# Patient Record
Sex: MALE | Race: AMERICAN INDIAN OR ALASKA NATIVE | NOT HISPANIC OR LATINO | URBAN - METROPOLITAN AREA
[De-identification: names, ages, dates, MRNs, and addresses within clinical notes are randomized per-mention and may not be internally consistent; named-entity substitution may affect disease eponyms.]

---

## 2018-05-16 ENCOUNTER — APPOINTMENT (OUTPATIENT)
Dept: GENERAL RADIOLOGY | Facility: CLINIC | Age: 63
End: 2018-05-16
Attending: EMERGENCY MEDICINE
Payer: COMMERCIAL

## 2018-05-16 ENCOUNTER — APPOINTMENT (OUTPATIENT)
Dept: CT IMAGING | Facility: CLINIC | Age: 63
End: 2018-05-16
Attending: EMERGENCY MEDICINE
Payer: COMMERCIAL

## 2018-05-16 ENCOUNTER — HOSPITAL ENCOUNTER (EMERGENCY)
Facility: CLINIC | Age: 63
Discharge: HOME | End: 2018-05-16
Attending: EMERGENCY MEDICINE | Admitting: EMERGENCY MEDICINE
Payer: COMMERCIAL

## 2018-05-16 VITALS
SYSTOLIC BLOOD PRESSURE: 133 MMHG | TEMPERATURE: 99.2 F | WEIGHT: 251 LBS | RESPIRATION RATE: 16 BRPM | OXYGEN SATURATION: 96 % | HEIGHT: 70 IN | DIASTOLIC BLOOD PRESSURE: 81 MMHG | BODY MASS INDEX: 35.93 KG/M2

## 2018-05-16 DIAGNOSIS — M62.81 GENERALIZED MUSCLE WEAKNESS: ICD-10-CM

## 2018-05-16 LAB
ALBUMIN UR-MCNC: NEGATIVE MG/DL
ANION GAP SERPL CALCULATED.3IONS-SCNC: 10 MMOL/L (ref 3–14)
APPEARANCE UR: CLEAR
BASOPHILS # BLD AUTO: 0 10E9/L (ref 0–0.2)
BASOPHILS NFR BLD AUTO: 0.2 %
BILIRUB UR QL STRIP: NEGATIVE
BUN SERPL-MCNC: 22 MG/DL (ref 7–30)
CALCIUM SERPL-MCNC: 8.8 MG/DL (ref 8.5–10.1)
CHLORIDE SERPL-SCNC: 100 MMOL/L (ref 94–109)
CO2 SERPL-SCNC: 26 MMOL/L (ref 20–32)
COLOR UR AUTO: YELLOW
CREAT SERPL-MCNC: 1.33 MG/DL (ref 0.66–1.25)
DIFFERENTIAL METHOD BLD: ABNORMAL
EOSINOPHIL # BLD AUTO: 0.1 10E9/L (ref 0–0.7)
EOSINOPHIL NFR BLD AUTO: 0.8 %
ERYTHROCYTE [DISTWIDTH] IN BLOOD BY AUTOMATED COUNT: 13.4 % (ref 10–15)
GFR SERPL CREATININE-BSD FRML MDRD: 54 ML/MIN/1.7M2
GLUCOSE SERPL-MCNC: 129 MG/DL (ref 70–99)
GLUCOSE UR STRIP-MCNC: NEGATIVE MG/DL
HCT VFR BLD AUTO: 38.8 % (ref 40–53)
HGB BLD-MCNC: 13.1 G/DL (ref 13.3–17.7)
HGB UR QL STRIP: NEGATIVE
IMM GRANULOCYTES # BLD: 0 10E9/L (ref 0–0.4)
IMM GRANULOCYTES NFR BLD: 0.2 %
INTERPRETATION ECG - MUSE: NORMAL
KETONES UR STRIP-MCNC: NEGATIVE MG/DL
LEUKOCYTE ESTERASE UR QL STRIP: NEGATIVE
LYMPHOCYTES # BLD AUTO: 1.4 10E9/L (ref 0.8–5.3)
LYMPHOCYTES NFR BLD AUTO: 11.4 %
MCH RBC QN AUTO: 28.9 PG (ref 26.5–33)
MCHC RBC AUTO-ENTMCNC: 33.8 G/DL (ref 31.5–36.5)
MCV RBC AUTO: 86 FL (ref 78–100)
MONOCYTES # BLD AUTO: 0.9 10E9/L (ref 0–1.3)
MONOCYTES NFR BLD AUTO: 7.3 %
NEUTROPHILS # BLD AUTO: 9.5 10E9/L (ref 1.6–8.3)
NEUTROPHILS NFR BLD AUTO: 80.1 %
NITRATE UR QL: NEGATIVE
NRBC # BLD AUTO: 0 10*3/UL
NRBC BLD AUTO-RTO: 0 /100
PH UR STRIP: 6 PH (ref 5–7)
PLATELET # BLD AUTO: 150 10E9/L (ref 150–450)
POTASSIUM SERPL-SCNC: 3.5 MMOL/L (ref 3.4–5.3)
RBC # BLD AUTO: 4.54 10E12/L (ref 4.4–5.9)
SODIUM SERPL-SCNC: 136 MMOL/L (ref 133–144)
SOURCE: NORMAL
SP GR UR STRIP: 1.01 (ref 1–1.03)
TROPONIN I SERPL-MCNC: <0.015 UG/L (ref 0–0.04)
TSH SERPL DL<=0.005 MIU/L-ACNC: 1.24 MU/L (ref 0.4–4)
UROBILINOGEN UR STRIP-MCNC: NORMAL MG/DL (ref 0–2)
WBC # BLD AUTO: 11.8 10E9/L (ref 4–11)

## 2018-05-16 PROCEDURE — 93005 ELECTROCARDIOGRAM TRACING: CPT

## 2018-05-16 PROCEDURE — 80048 BASIC METABOLIC PNL TOTAL CA: CPT | Performed by: EMERGENCY MEDICINE

## 2018-05-16 PROCEDURE — 85025 COMPLETE CBC W/AUTO DIFF WBC: CPT | Performed by: EMERGENCY MEDICINE

## 2018-05-16 PROCEDURE — 99285 EMERGENCY DEPT VISIT HI MDM: CPT | Mod: 25

## 2018-05-16 PROCEDURE — 70450 CT HEAD/BRAIN W/O DYE: CPT

## 2018-05-16 PROCEDURE — 84443 ASSAY THYROID STIM HORMONE: CPT | Performed by: EMERGENCY MEDICINE

## 2018-05-16 PROCEDURE — 71046 X-RAY EXAM CHEST 2 VIEWS: CPT

## 2018-05-16 PROCEDURE — 84484 ASSAY OF TROPONIN QUANT: CPT | Performed by: EMERGENCY MEDICINE

## 2018-05-16 PROCEDURE — 81003 URINALYSIS AUTO W/O SCOPE: CPT | Performed by: EMERGENCY MEDICINE

## 2018-05-16 NOTE — DISCHARGE INSTRUCTIONS
Please return to the ED if you have active chest pain, shortness of breath, nausea, sweatiness, or other acute changes.  Please follow up with your PCP in the next 2-3 days.          Weakness with Uncertain Cause  Based on your exam today, the exact cause of your weakness is not certain. But your weakness does not seem to be a sign of a serious illness at this time. Keep an eye on your symptoms and get medical advice as instructed below.  Home care    Rest at home today. Don't over-exert yourself.    Take any medicine as prescribed.    For the next few days, drink extra fluids (unless your healthcare provider wants you to restrict fluids for other reasons). Don't skip meals.    Unless otherwise directed, continue to take any prescription medicines.    Contact your healthcare provider if you have any questions or concerns.  Follow-up care  Follow up with your healthcare provider, or as advised.  When to seek medical advice  Call your healthcare provider right away for any of the following:    Symptoms get worse    Symptoms don't start getting better within 2 days    Fever of 100.4  F (38  C) or higher, or as directed by your healthcare provider  Call 911  Call 911 for any of these:    Chest, arm, neck, jaw, or upper back pain    Trouble breathing    Numbness or weakness of the face, one arm, or one leg    Slurred speech, confusion, or trouble speaking, walking, or seeing    Blood in vomit or stool (black or red color)    Loss of consciousness    Severe headache  Date Last Reviewed: 10/1/2017    6322-6809 [employer]. [address] [place] [address]. All rights reserved. This information is not intended as a substitute for professional medical care. Always follow your healthcare professional's instructions.

## 2018-05-16 NOTE — ED AVS SNAPSHOT
Emergency Department    [facility] [address]    Phone:  [phone]    Fax:  [phone]                                       Keny Bernard   MRN: [id number]    Department:   Emergency Department   Date of Visit:  5/16/2018           Patient Information     Date Of Birth          1955        Your diagnoses for this visit were:     Generalized muscle weakness        You were seen by Yuliana Nunez MD.      Follow-up Information     Follow up with [facility]. Schedule an appointment as soon as possible for a visit in 2 days.    Contact information:    [address] [place] [address]  [phone]          Discharge Instructions       Please return to the ED if you have active chest pain, shortness of breath, nausea, sweatiness, or other acute changes.  Please follow up with your PCP in the next 2-3 days.          Weakness with Uncertain Cause  Based on your exam today, the exact cause of your weakness is not certain. But your weakness does not seem to be a sign of a serious illness at this time. Keep an eye on your symptoms and get medical advice as instructed below.  Home care    Rest at home today. Don't over-exert yourself.    Take any medicine as prescribed.    For the next few days, drink extra fluids (unless your healthcare provider wants you to restrict fluids for other reasons). Don't skip meals.    Unless otherwise directed, continue to take any prescription medicines.    Contact your healthcare provider if you have any questions or concerns.  Follow-up care  Follow up with your healthcare provider, or as advised.  When to seek medical advice  Call your healthcare provider right away for any of the following:    Symptoms get worse    Symptoms don't start getting better within 2 days    Fever of 100.4  F (38  C) or higher, or as directed by your healthcare provider  Call 911  Call 911 for any of these:    Chest, arm, neck, jaw, or upper back  pain    Trouble breathing    Numbness or weakness of the face, one arm, or one leg    Slurred speech, confusion, or trouble speaking, walking, or seeing    Blood in vomit or stool (black or red color)    Loss of consciousness    Severe headache  Date Last Reviewed: 10/1/2017    9192-8240 [employer]. [address] [place] [address]. All rights reserved. This information is not intended as a substitute for professional medical care. Always follow your healthcare professional's instructions.          24 Hour Appointment Hotline       To make an appointment at any [facility], call [phone]). If you don't have a family doctor or clinic, we will help you find one. [facility] clinics are conveniently located to serve the needs of you and your family.             Review of your medicines      Our records show that you are taking the medicines listed below. If these are incorrect, please call your family doctor or clinic.        Dose / Directions Last dose taken    amLODIPine-atorvastatin 10-10 MG per tablet   Commonly known as:  CADUET   Dose:  1 tablet        Take 1 tablet by mouth daily   Refills:  0        ATENOLOL PO   Dose:  100 mg        Take 100 mg by mouth daily   Refills:  0        CITALOPRAM HYDROBROMIDE PO   Dose:  20 mg        Take 20 mg by mouth daily   Refills:  0        ENALAPRIL MALEATE PO   Dose:  20 mg        Take 20 mg by mouth   Refills:  0        HYDROCHLOROTHIAZIDE PO   Dose:  25 mg        Take 25 mg by mouth daily   Refills:  0        IMDUR PO   Dose:  60 mg        Take 60 mg by mouth daily   Refills:  0        LEVOTHYROXINE SODIUM PO   Dose:  25 mcg        Take 25 mcg by mouth daily   Refills:  0        METHYLPHENIDATE HCL PO   Dose:  20 mg        Take 20 mg by mouth daily ER(extended release)   Refills:  0        Multi-vitamin Tabs tablet   Dose:  1 tablet        Take 1 tablet by mouth daily   Refills:  0        OMEPRAZOLE PO   Dose:  20 mg         Take 20 mg by mouth 2 times daily (before meals)   Refills:  0        SIMVASTATIN PO   Dose:  20 mg        Take 20 mg by mouth At Bedtime   Refills:  0        TRAMADOL HCL PO   Dose:  50 mg        Take 50 mg by mouth every 6 hours as needed for moderate to severe pain   Refills:  0        VITAMIN D (CHOLECALCIFEROL) PO   Dose:  1000 Units        Take 1,000 Units by mouth daily   Refills:  0                Procedures and tests performed during your visit     Basic metabolic panel    CBC with platelets differential    CT Head w/o Contrast    EKG 12-lead, tracing only    TSH with free T4 reflex    Troponin I    UA reflex to Microscopic and Culture    XR Chest 2 Views      Orders Needing Specimen Collection     None      Pending Results     No orders found from 5/14/2018 to 5/17/2018.            Pending Culture Results     No orders found from 5/14/2018 to 5/17/2018.            Pending Results Instructions     If you had any lab results that were not finalized at the time of your Discharge, you can call the ED Lab Result RN at [phone]. You will be contacted by this team for any positive Lab results or changes in treatment. The nurses are available 7 days a week from 10A to 6:30P.  You can leave a message 24 hours per day and they will return your call.        Test Results From Your Hospital Stay        5/16/2018 12:07 PM      Component Results     Component Value Ref Range & Units Status    WBC 11.8 (H) 4.0 - 11.0 10e9/L Final    RBC Count 4.54 4.4 - 5.9 10e12/L Final    Hemoglobin 13.1 (L) 13.3 - 17.7 g/dL Final    Hematocrit 38.8 (L) 40.0 - 53.0 % Final    MCV 86 78 - 100 fl Final    MCH 28.9 26.5 - 33.0 pg Final    MCHC 33.8 31.5 - 36.5 g/dL Final    RDW 13.4 10.0 - 15.0 % Final    Platelet Count 150 150 - 450 10e9/L Final    Diff Method Automated Method  Final    % Neutrophils 80.1 % Final    % Lymphocytes 11.4 % Final    % Monocytes 7.3 % Final    % Eosinophils 0.8 % Final    % Basophils 0.2 % Final    %  Immature Granulocytes 0.2 % Final    Nucleated RBCs 0 0 /100 Final    Absolute Neutrophil 9.5 (H) 1.6 - 8.3 10e9/L Final    Absolute Lymphocytes 1.4 0.8 - 5.3 10e9/L Final    Absolute Monocytes 0.9 0.0 - 1.3 10e9/L Final    Absolute Eosinophils 0.1 0.0 - 0.7 10e9/L Final    Absolute Basophils 0.0 0.0 - 0.2 10e9/L Final    Abs Immature Granulocytes 0.0 0 - 0.4 10e9/L Final    Absolute Nucleated RBC 0.0  Final         5/16/2018 12:24 PM      Component Results     Component Value Ref Range & Units Status    Sodium 136 133 - 144 mmol/L Final    Potassium 3.5 3.4 - 5.3 mmol/L Final    Chloride 100 94 - 109 mmol/L Final    Carbon Dioxide 26 20 - 32 mmol/L Final    Anion Gap 10 3 - 14 mmol/L Final    Glucose 129 (H) 70 - 99 mg/dL Final    Urea Nitrogen 22 7 - 30 mg/dL Final    Creatinine 1.33 (H) 0.66 - 1.25 mg/dL Final    GFR Estimate 54 (L) >60 mL/min/1.7m2 Final    Non  GFR Calc    GFR Estimate If Black 66 >60 mL/min/1.7m2 Final    African American GFR Calc    Calcium 8.8 8.5 - 10.1 mg/dL Final         5/16/2018 12:29 PM      Component Results     Component Value Ref Range & Units Status    Troponin I ES <0.015 0.000 - 0.045 ug/L Final    The 99th percentile for upper reference range is 0.045 ug/L.  Troponin values   in the range of 0.045 - 0.120 ug/L may be associated with risks of adverse   clinical events.           5/16/2018 12:39 PM      Component Results     Component Value Ref Range & Units Status    Color Urine Yellow  Final    Appearance Urine Clear  Final    Glucose Urine Negative NEG^Negative mg/dL Final    Bilirubin Urine Negative NEG^Negative Final    Ketones Urine Negative NEG^Negative mg/dL Final    Specific Gravity Urine 1.013 1.003 - 1.035 Final    Blood Urine Negative NEG^Negative Final    pH Urine 6.0 5.0 - 7.0 pH Final    Protein Albumin Urine Negative NEG^Negative mg/dL Final    Urobilinogen mg/dL Normal 0.0 - 2.0 mg/dL Final    Nitrite Urine Negative NEG^Negative Final    Leukocyte  Esterase Urine Negative NEG^Negative Final    Source Midstream Urine  Final         5/16/2018 12:23 PM      Narrative     CHEST TWO VIEWS 5/16/2018 12:05 PM     HISTORY: Weakness.     COMPARISON: None.    FINDINGS: Heart size and pulmonary vascularity are within normal  limits. The lungs are clear. No pneumothorax or pleural effusion.         Impression     IMPRESSION: No radiographic evidence of acute chest abnormality.     SANDRA HECTOR MD         5/16/2018  1:07 PM      Narrative     CT HEAD WITHOUT CONTRAST  5/16/2018 12:08 PM    HISTORY: Headache and weakness.    TECHNIQUE: Scans were obtained through the head without IV contrast.  Radiation dose for this scan was reduced using automated exposure  control, adjustment of the mA and/or kV according to patient size, or  iterative reconstruction technique.    COMPARISON: None.    FINDINGS: Mild atrophy and chronic white matter disease.  No  hemorrhage, mass lesion, or focal area of acute infarction identified.  Paranasal sinuses are normal. No bony abnormality.        Impression     IMPRESSION:   1. Atrophy and chronic white matter disease.  2. Nothing acute.     YAYA GONGORA MD         5/16/2018 12:33 PM      Component Results     Component Value Ref Range & Units Status    TSH 1.24 0.40 - 4.00 mU/L Final                Clinical Quality Measure: Blood Pressure Screening     Your blood pressure was checked while you were in the emergency department today. The last reading we obtained was  BP: 133/81 . Please read the guidelines below about what these numbers mean and what you should do about them.  If your systolic blood pressure (the top number) is less than 120 and your diastolic blood pressure (the bottom number) is less than 80, then your blood pressure is normal. There is nothing more that you need to do about it.  If your systolic blood pressure (the top number) is 120-139 or your diastolic blood pressure (the bottom number) is 80-89, your blood  "pressure may be higher than it should be. You should have your blood pressure rechecked within a year by a primary care provider.  If your systolic blood pressure (the top number) is 140 or greater or your diastolic blood pressure (the bottom number) is 90 or greater, you may have high blood pressure. High blood pressure is treatable, but if left untreated over time it can put you at risk for heart attack, stroke, or kidney failure. You should have your blood pressure rechecked by a primary care provider within the next 4 weeks.  If your provider in the emergency department today gave you specific instructions to follow-up with your doctor or provider even sooner than that, you should follow that instruction and not wait for up to 4 weeks for your follow-up visit.        Thank you for choosing [facility]       Thank you for choosing [facility] for your care. Our goal is always to provide you with excellent care. Hearing back from our patients is one way we can continue to improve our services. Please take a few minutes to complete the written survey that you may receive in the mail after you visit with us. Thank you!        [employer] Information     [employer] lets you send messages to your doctor, view your test results, renew your prescriptions, schedule appointments and more. To sign up, go to [email or web address] . Click on \"Log in\" on the left side of the screen, which will take you to the Welcome page. Then click on \"Sign up Now\" on the right side of the page.     You will be asked to enter the access code listed below, as well as some personal information. Please follow the directions to create your username and password.     Your access code is: [email or web address]  Expires: 2018  2:57 PM     Your access code will  in 90 days. If you need help or a new code, please call your [facility] clinic or [phone].        Care EveryWhere ID     This is your Care EveryWhere ID. This could be used by other organizations " to access your [facility] medical records  [id number]        Equal Access to Services     JOAO ROQUE : Jaelyn Lilly, yeni smart, sebastian lu. So [facility] [phone].    ATENCIÓN: Si habla español, tiene a heck disposición servicios gratuitos de asistencia lingüística. Llame al [phone].    We comply with applicable federal civil rights laws and [place] laws. We do not discriminate on the basis of race, color, national origin, age, disability, sex, sexual orientation, or gender identity.            After Visit Summary       This is your record. Keep this with you and show to your community pharmacist(s) and doctor(s) at your next visit.

## 2018-05-16 NOTE — ED AVS SNAPSHOT
Emergency Department    [address] [place] [address]    Phone:  [phone]    Fax:  [phone]                                       Keny Bernard   MRN: [id number]    Department:   Emergency Department   Date of Visit:  5/16/2018           After Visit Summary Signature Page     I have received my discharge instructions, and my questions have been answered. I have discussed any challenges I see with this plan with the nurse or doctor.    ..........................................................................................................................................  Patient/Patient Representative Signature      ..........................................................................................................................................  Patient Representative Print Name and Relationship to Patient    ..................................................               ................................................  Date                                            Time    ..........................................................................................................................................  Reviewed by Signature/Title    ...................................................              ..............................................  Date                                                            Time

## 2018-05-16 NOTE — ED PROVIDER NOTES
History     Chief Complaint:  Generalized Weakness    HPI   Keny Bernard is a 62 year old male who presents with concerns for generalized weakness. The patient works as a  and today while working he experienced a headache followed by a sudden onset of generalized weakness. The patient rested and then attempted to continue working though was unable to. He also noticed unsteady gain and tremulous forearms. He reports the headache is normal for him.  He gets headaches and eats, then the headache resolves.  Not a new headache.  In the ED, the patient feels improved though still feels weak in his legs. He denies vision changes or double vision. chest pain or shortness of breath. The patient has an ongoing problem of GERD, abdominal pain, dark diarrhea and vomiting and is scheduled to see GI in the next few weeks. The patient notes that 5 of his 6 brothers have a cardiac history and that his mother has a history of stroke. He denies personal cardiac history. He denies alcohol or illicit drugs. The patient uses tramadol regularly for his back pain. He has been eating and drinking normally.     Allergies:  Pollen Extract    Medications:    Caduet  Atenolol  Citalopram hydrobromide  Enalapril maleate  Hydrochlorothiazide  Imdur  Levothyroxine sodium  Methylphenidate  Multivitamin  Omeprazole  Simvastatin  Tramadol  Vitamin D    Past Medical History:    ADHD  Chronic kidney disease  Diabetes mellitus type 2  Esophageal reflux  Foot injury  Hand pain  High risk medication use  History of acute renal failure  History of morbid obesity  Hyperlipidemia  Hypertension  Iron deficiency  Joint pain  Myalgia and myositis  Obesity  Other and unspecified postsurgical non-absorption  Preoperative exam  Prepatellar bursitis  Ptosis of eyelid  Sensorineural hearing loss in both ears  Sleep apnea  Vitamin B12 deficiency  Vitamin D deficiency    Past Surgical History:    Arthroscopic: Knee  Bypass gastric Bruce-en-Y, liver biopsy,  "combined  Colonoscopy  Laparoscopic cholecystectomy  Repair ptosis bilateral    Family History:    The patient denies any relevant family medical history.    Social History:  Smoking Status: quit 1995  Smokeless Tobacco: none  Alcohol Use: no    Marital Status:   [2]    Review of Systems   All other systems reviewed and are negative.    Physical Exam   First Vitals:  BP: 113/78  Heart Rate: 75  Temp: 99.2  F (37.3  C)  Resp: 16  Height: 177.8 cm (5' 10\")  Weight: 113.9 kg (251 lb)  SpO2: 97 %    Physical Exam  Physical Exam   General: Resting on the chair   Head: No obvious trauma to head.  Ears, Nose, Throat:  External ears normal.  Nose normal.    Eyes:  Conjunctivae clear.  Pupils are equal, round, and reactive.   Neck: Normal range of motion.  Neck supple.   CV: Regular rate and rhythm.  No murmurs.      Respiratory: Effort normal and breath sounds normal.  No wheezing or crackles.   Gastrointestinal: Soft.  No distension. There is no tenderness.    Neuro: Alert. Moving all extremities appropriately.  Normal speech.  CN II-XII grossly intact, no pronator drift, normal finger-nose-finger, visual fields intact.  Gross muscle strength intact of the proximal and distal bilateral upper and lower extremities.  Sensation intact to light touch in all 4 extremities.  Normal gait.    Skin: Skin is warm and dry.  No rash noted.   Psych: anxious appearing    Emergency Department Course   ECG:  Indication: generalized weakness  Completed at 1228.  Read at 1243.   Rate 66 bpm. VT interval 206. QRS duration 140. QT/QTc 428/448. P-R-T axes 57 70 34.    Normal sinus rhythm. Right bundle branch block. Abnormal ECG.    Imaging:  Radiographic findings were communicated with the patient who voiced understanding of the findings.  CT Head without contrast:   1. Atrophy and chronic white matter disease.  2. Nothing acute.  As per radiology.    XR Chest 2 views:   No radiographic evidence of acute chest abnormality. As per " radiology.     Laboratory:  UA with micro: negative  CBC: WBC 11.8 (H), HGB 13.1 (L), HCT 38.8 (L), absolute neutrophil 9.5 (H) o/w WNL. ()  BMP: Glucose 129 (H), creatinine 1.33 (H), GFR estimate 54 (L), o/w WNL   Troponin (Collected 1153): <0.015  TSH with free T4 reflex: 1.24    Emergency Department Course:  Nursing notes and vitals reviewed. I performed an exam of the patient as documented above.     Blood drawn. This was sent to the lab for further testing, results above.    The patient provided a urine sample here in the emergency department. This was sent for laboratory testing, findings above.     EKG obtained in the ED, see results above.     The patient was sent for a CT head and Chest X-ray while in the emergency department, findings above.     1420 I rechecked the patient and discussed the results of his workup thus far.     1455 I updated the patient and discussed the plan for discharge.     Findings and plan explained to the Patient. Patient discharged home with instructions regarding supportive care, medications, and reasons to return. The importance of close follow-up was reviewed.     I personally reviewed the laboratory results with the Patient and answered all related questions prior to discharge.   Impression & Plan    Medical Decision Making:  Keny Bernard is a 61 y/o male with a history of chronic back pain, hypertension, presents with generalized weakness. Vital signs are unremarkable. Broad differential pursued including but not limited to infection, UTI, pneumonia, acute coronary syndrome, stroke, mass, intracranial hemorrhage, ect. The patient is otherwise well appearing and nontoxic. His neurologic exam is non focal and does not meet stroke criteria. Head CT was obtained showing no acute intracranial hemorrhage, mass or tumor. With minimal headache and negative head CT does not appear concerning for SAH.  Chest X-ray was negative for any acute, effusion or pneumothorax. CBC shows  mild leukocytosis of 11.8 and a hemoglobin of 13.1 only mildly low. These are both of unclear clinical significance. BMP shows no acute electrolyte or metabolic abnormality. Mild acute kidney injury with creatinine bumped to 1.33, this was relayed to the patient and I advised that he drink more fluids. He reports that this is old. TSH is normal not concerning for hypo/hyperthyroidism. Urine analysis is bland without any evidence of infection. EKG shows right bundle branch block but no acute ST T-wave changes. The patient has no chest pain, no shortness of breath, no diaphoresis. He has no active signs concerning for acute coronary syndrome. His troponin is negative. He does have some cardiac risk factors but as I said earlier, there are no signs concerning for ACS. I doubt ACS at this point. The patient is feeling improved without intervention. He was offered observation vs home. At this point given that he is feeling better he would like to go home. He also voices concern that he has not been feeling well that his mother recently , there seems to be a lot of things going on socially at home. I suspect these are contributing to his generalized weakness. Because there is a reassuring workup and reassuring examination, I felt that he was appropriate for discharge to home. I advised that he see his primary care doctor in 1-2 days. Strict return precautions were discussed with the patient and he was discharged in stable condition.     Diagnosis:    ICD-10-CM    1. Generalized muscle weakness M62.81      Disposition:  discharged to home    Balbir JAMES, am serving as a scribe on 2018 at 11:51 AM to personally document services performed by Yuliana Nunez MD based on my observations and the provider's statements to me.     Balbir Vicente  2018    EMERGENCY DEPARTMENT       Yuliana Nunez MD  [phone]

## 2022-03-16 ENCOUNTER — TRANSFERRED RECORDS (OUTPATIENT)
Dept: HEALTH INFORMATION MANAGEMENT | Facility: CLINIC | Age: 67
End: 2022-03-16

## 2022-08-15 ENCOUNTER — TRANSFERRED RECORDS (OUTPATIENT)
Dept: HEALTH INFORMATION MANAGEMENT | Facility: CLINIC | Age: 67
End: 2022-08-15

## 2022-08-15 ENCOUNTER — MEDICAL CORRESPONDENCE (OUTPATIENT)
Dept: HEALTH INFORMATION MANAGEMENT | Facility: CLINIC | Age: 67
End: 2022-08-15

## 2022-08-17 ENCOUNTER — TRANSCRIBE ORDERS (OUTPATIENT)
Dept: OTHER | Age: 67
End: 2022-08-17

## 2022-08-17 DIAGNOSIS — G91.2 NPH (NORMAL PRESSURE HYDROCEPHALUS) (H): Primary | ICD-10-CM

## 2022-08-19 ENCOUNTER — TELEPHONE (OUTPATIENT)
Dept: NEUROSURGERY | Facility: CLINIC | Age: 67
End: 2022-08-19

## 2022-08-19 NOTE — TELEPHONE ENCOUNTER
[facility] Call Center    Phone Message    May a detailed message be left on voicemail: yes     Reason for Call: Appointment Intake    Referring Provider Name: Dr Suhas Rhoades  Diagnosis and/or Symptoms:   Normopressure Hydrocephalus (NPH)    Patient's wife Sarina is requesting a call back to schedule for the diagnosis above. Writer unable to schedule due to protocols directing to send to neurosurgery and no notes on whom to schedule with are provided. Referral is under Neurology. Please call Sarina back to schedule appropriately at # [phone]    Action Taken: Message routed to:  [facility] ([facility]): Neurosurgery     Travel Screening: Not Applicable

## 2022-08-23 NOTE — TELEPHONE ENCOUNTER
Writer spoke with Sarina and scheduled an in person visit with Dr. Pozo for 8/30    Shellie Darnell

## 2022-08-24 NOTE — TELEPHONE ENCOUNTER
Action 8/24/22 Mv 9.48am   Action Taken Imaging request faxed to [facility] ([facility] images resolved)  Fax # [phone]  Tracking # [id number]    8/26/22 MV 2.06pm  Images resolved iN PACS         RECORDS RECEIVED FROM: external   REASON FOR VISIT: NPH   Date of Appt: 8/30/22   NOTES (FOR ALL VISITS) STATUS DETAILS   OFFICE NOTE from referring provider Care Everywhere Dr Nito Trimble @ [facility]:  8/15/22  7/18/22  5/27/22  4/29/22  4/18/22  3/16/22   DISCHARGE REPORT from the ER Care Everywhere [facility]:  4/27/22  3/7/22   MEDICATION LIST Care Everywhere    IMAGING  (FOR ALL VISITS)     MRI (HEAD, NECK, SPINE) Received [facility]:  MRI Head 4/27/22  MRI Head 3/21/22   CT (HEAD, NECK, SPINE) Received [facility]:  CT Head 8/15/22    [facility]:  CT Head 3/7/22  CT Head 1/26/22

## 2022-08-30 ENCOUNTER — PRE VISIT (OUTPATIENT)
Dept: NEUROSURGERY | Facility: CLINIC | Age: 67
End: 2022-08-30

## 2022-08-30 ENCOUNTER — OFFICE VISIT (OUTPATIENT)
Dept: NEUROSURGERY | Facility: CLINIC | Age: 67
End: 2022-08-30
Payer: COMMERCIAL

## 2022-08-30 VITALS
HEART RATE: 65 BPM | DIASTOLIC BLOOD PRESSURE: 81 MMHG | RESPIRATION RATE: 16 BRPM | SYSTOLIC BLOOD PRESSURE: 130 MMHG | OXYGEN SATURATION: 96 %

## 2022-08-30 DIAGNOSIS — G93.89 CEREBRAL VENTRICULOMEGALY: Primary | ICD-10-CM

## 2022-08-30 PROCEDURE — 99203 OFFICE O/P NEW LOW 30 MIN: CPT | Performed by: NEUROLOGICAL SURGERY

## 2022-08-30 RX ORDER — METOPROLOL TARTRATE 50 MG
1 TABLET ORAL 2 TIMES DAILY
COMMUNITY
Start: 2021-08-31

## 2022-08-30 RX ORDER — AMLODIPINE BESYLATE 5 MG/1
TABLET ORAL
COMMUNITY
Start: 2022-06-12

## 2022-08-30 RX ORDER — ACETAZOLAMIDE 250 MG/1
250 TABLET ORAL
COMMUNITY
Start: 2022-07-18

## 2022-08-30 RX ORDER — OMEPRAZOLE 40 MG/1
40 CAPSULE, DELAYED RELEASE ORAL
COMMUNITY

## 2022-08-30 RX ORDER — PRAMIPEXOLE DIHYDROCHLORIDE 0.5 MG/1
1 TABLET ORAL DAILY
COMMUNITY
Start: 2022-08-04

## 2022-08-30 RX ORDER — FOLIC ACID 1 MG/1
TABLET ORAL
COMMUNITY
Start: 2022-08-18

## 2022-08-30 RX ORDER — SPIRONOLACTONE 25 MG/1
TABLET ORAL
COMMUNITY
Start: 2022-06-14

## 2022-08-30 RX ORDER — GABAPENTIN 300 MG/1
900 CAPSULE ORAL
COMMUNITY
Start: 2022-04-29

## 2022-08-30 RX ORDER — FAMOTIDINE 40 MG/1
TABLET, FILM COATED ORAL
COMMUNITY
Start: 2022-08-12

## 2022-08-30 RX ORDER — FAMOTIDINE 40 MG/1
1 TABLET, FILM COATED ORAL
COMMUNITY
Start: 2022-02-24

## 2022-08-30 RX ORDER — VENLAFAXINE HYDROCHLORIDE 75 MG/1
CAPSULE, EXTENDED RELEASE ORAL
COMMUNITY
Start: 2022-05-29

## 2022-08-30 RX ORDER — DIVALPROEX SODIUM 500 MG/1
TABLET, DELAYED RELEASE ORAL
COMMUNITY
Start: 2022-07-18

## 2022-08-30 RX ORDER — TAMSULOSIN HYDROCHLORIDE 0.4 MG/1
CAPSULE ORAL
COMMUNITY
Start: 2022-05-31

## 2022-08-30 NOTE — PROGRESS NOTES
Service Date: 2022    Suhas Rhoades MD     RE:  Keny Bernard  MRN:  [id number]  :  1955        Dear Dr. Rhoades:      I had the opportunity to see Mr. Bernard today in my clinic.  As you know, he is a 67-year-old man who works as a .  The past several years, he has been having a gradual decline in his ability to walk, namely mainly because of his balance.  He said he hit his head approximately a year ago and has been declining since then.  He complains of headaches that are migratory in nature, sometimes it is bitemporal, sometimes in the back of his head, sometimes it is at the top of his head.  It is never global.  He takes Tylenol for this and it gets better.  He does not have any incontinence.  He says that he is able to walk for 4 hours without stopping.  However, it is mainly a balance issue for him and it is worse if he was to close his eyes.  He says that he also has back pain and knee issues that are impacting his walking.    On examination Mr. Bernard is a very pleasant gentleman, in no apparent distress.  His speech content is normal.  His vision is grossly normal, although he says sometimes he gets double vision.  He is able to remember 1 out of 3 things when asked of him.  He remembers what he had for dinner.  He also has good strength bilaterally.  On gait exam, he is able to walk with a normal stride, but his balance is very poor.  With his eyes closed he is not able to stand.    The CT scan does not demonstrate hydrocephalus, per se.  I agree with the radiologist's assessment on this.  Furthermore, they had noted that he had atrophy, which is global and cortical in nature.  This seems to be out of proportion to a normal 60-year-old.  In addition, from comparing the CT that he had before, it appears to be worsening as well.    Mr. Bernard does not have a good picture of normal pressure hydrocephalus.  What is concerning is that he does have brain atrophy and his continued  memory loss.  He has also had unsteadiness in gait, which appears not necessarily related to his gait as much as his balance.  My recommendation is he continues to have further workup in this direction.  If normal pressure hydrocephalus is a concern in the future then a high volume lumbar puncture may be of value, but at the current time, it is uncertain if it would yield the information that we would need.  Thank you very much for allowing us to see this very pleasant patient.    Sincerely,     Best Pozo MD        D: 2022   T: 2022   MT: daren    Name:     BAKARI CLAROS  MRN:      [id number]        Account:      [id number]   :      1955           Service Date: 2022       Document: [id number]

## 2022-08-30 NOTE — LETTER
2022       RE: Keny Bernard  [address] [address] [place] [id number]     Dear Colleague,    Thank you for referring your patient, Keny Bernard, to the [facility] [place] [facility]. Please see a copy of my visit note below.    Service Date: 2022    Suhas Rhoades MD     RE:  Keny Bernard  MRN:  [id number]  :  1955        Dear Dr. Rhoades:      I had the opportunity to see Mr. Bernard today in my clinic.  As you know, he is a 67-year-old man who works as a .  The past several years, he has been having a gradual decline in his ability to walk, namely mainly because of his balance.  He said he hit his head approximately a year ago and has been declining since then.  He complains of headaches that are migratory in nature, sometimes it is bitemporal, sometimes in the back of his head, sometimes it is at the top of his head.  It is never global.  He takes Tylenol for this and it gets better.  He does not have any incontinence.  He says that he is able to walk for 4 hours without stopping.  However, it is mainly a balance issue for him and it is worse if he was to close his eyes.  He says that he also has back pain and knee issues that are impacting his walking.    On examination Mr. Bernard is a very pleasant gentleman, in no apparent distress.  His speech content is normal.  His vision is grossly normal, although he says sometimes he gets double vision.  He is able to remember 1 out of 3 things when asked of him.  He remembers what he had for dinner.  He also has good strength bilaterally.  On gait exam, he is able to walk with a normal stride, but his balance is very poor.  With his eyes closed he is not able to stand.    The CT scan does not demonstrate hydrocephalus, per se.  I agree with the radiologist's assessment on this.  Furthermore, they had noted that he had atrophy, which is global and cortical in  nature.  This seems to be out of proportion to a normal 60-year-old.  In addition, from comparing the CT that he had before, it appears to be worsening as well.    Mr. Claros does not have a good picture of normal pressure hydrocephalus.  What is concerning is that he does have brain atrophy and his continued memory loss.  He has also had unsteadiness in gait, which appears not necessarily related to his gait as much as his balance.  My recommendation is he continues to have further workup in this direction.  If normal pressure hydrocephalus is a concern in the future then a high volume lumbar puncture may be of value, but at the current time, it is uncertain if it would yield the information that we would need.  Thank you very much for allowing us to see this very pleasant patient.    Sincerely,     Best Pozo MD        D: 2022   T: 2022   MT: daren    Name:     BAKARI CLAROS  MRN:      [id number]        Account:      [id number]   :      1955           Service Date: 2022       Document: [id number]

## 2023-01-01 ENCOUNTER — MEDICAL CORRESPONDENCE (OUTPATIENT)
Dept: HEALTH INFORMATION MANAGEMENT | Facility: CLINIC | Age: 68
End: 2023-01-01
Payer: COMMERCIAL

## 2023-01-01 ENCOUNTER — PRE VISIT (OUTPATIENT)
Dept: NEUROLOGY | Facility: CLINIC | Age: 68
End: 2023-01-01

## 2023-01-01 ENCOUNTER — TRANSCRIBE ORDERS (OUTPATIENT)
Dept: OTHER | Age: 68
End: 2023-01-01

## 2023-01-01 ENCOUNTER — OFFICE VISIT (OUTPATIENT)
Dept: NEUROLOGY | Facility: CLINIC | Age: 68
End: 2023-01-01
Payer: COMMERCIAL

## 2023-01-01 VITALS
BODY MASS INDEX: 35.93 KG/M2 | WEIGHT: 251 LBS | HEART RATE: 67 BPM | SYSTOLIC BLOOD PRESSURE: 144 MMHG | DIASTOLIC BLOOD PRESSURE: 93 MMHG | HEIGHT: 70 IN

## 2023-01-01 DIAGNOSIS — R26.9 GAIT DISTURBANCE: ICD-10-CM

## 2023-01-01 DIAGNOSIS — R41.3 MEMORY CHANGES: ICD-10-CM

## 2023-01-01 DIAGNOSIS — G91.2 NPH (NORMAL PRESSURE HYDROCEPHALUS) (H): Primary | ICD-10-CM

## 2023-01-01 PROCEDURE — 99205 OFFICE O/P NEW HI 60 MIN: CPT | Performed by: STUDENT IN AN ORGANIZED HEALTH CARE EDUCATION/TRAINING PROGRAM

## 2023-01-01 PROCEDURE — 99417 PROLNG OP E/M EACH 15 MIN: CPT | Performed by: STUDENT IN AN ORGANIZED HEALTH CARE EDUCATION/TRAINING PROGRAM

## 2023-01-01 RX ORDER — PROPRANOLOL HYDROCHLORIDE 120 MG/1
120 CAPSULE, EXTENDED RELEASE ORAL DAILY
COMMUNITY

## 2023-01-01 RX ORDER — FESOTERODINE FUMARATE 4 MG/1
4 TABLET, FILM COATED, EXTENDED RELEASE ORAL DAILY
COMMUNITY

## 2023-01-01 RX ORDER — LISINOPRIL 20 MG/1
20 TABLET ORAL DAILY
COMMUNITY

## 2023-01-01 RX ORDER — ASPIRIN 81 MG/1
81 TABLET, CHEWABLE ORAL DAILY
COMMUNITY

## 2023-01-01 RX ORDER — ACETAMINOPHEN 325 MG/1
650 TABLET ORAL 2 TIMES DAILY
COMMUNITY

## 2023-03-14 NOTE — TELEPHONE ENCOUNTER
RECORDS RECEIVED FROM: Internal   REASON FOR VISIT: New, NPH (normal pressure hydrocephalusPossible NPH, gait problem, second opinion   Date of Appt: 04/06/2023   NOTES (FOR ALL VISITS) STATUS DETAILS   OFFICE NOTE from referring provider Care Everywhere 01/25/2023 [facility]   OFFICE NOTE from other specialist Care Everywhere 03/09/2023 [facility]    DISCHARGE SUMMARY from hospital N/A    DISCHARGE REPORT from the ER N/A    OPERATIVE REPORT N/A    [facility] ([facility] ONLY) N/A    EMG Care Everywhere 01/25/2023 [facility]   12/29/2022 [facility]   03/16/2022 [facility]    EEG N/A    MEDICATION LIST N/A    IMAGING  (FOR ALL VISITS)     LUMBAR PUNCTURE N/A    LYLY SCAN N/A    ULTRASOUND (CAROTID BILAT) *VASCULAR* N/A    MRI (HEAD, NECK, SPINE) Received 03/07/2023 head  01/12/2023 lumbar spine  04/27/2022 head  03/21/2022 head   CT (HEAD, NECK, SPINE) Received 03/09/2023 CTV head  12/27/2022 head, CTA head neck        Images in PACS

## 2023-04-05 NOTE — PROGRESS NOTES
[facility]  Section of General Neurology  New Patient Visit      Keny Bernard MRN# [id number]   Age: 67 year old YOB: 1955              Assessment and Plan:     Keny Bernard is a 67 year old man who presents in consultation today.  He has had a decline over roughly 1 year's time and work up to date is suggestive of normal pressure hydrocephalus.  I do think he has more than age appropriate atrophy of his brain and that some dilation could be ex vacuo to an extent but to my review I do think this is more than simply ex vacuo changes and would meet radiological criteria for NPH.  He has profound issues with walking, gait does appear magnetic in the few steps he took.  MOCA was 7/30 today and he has had issues with incontinence.  This triad of symptoms is further supportive of NPH in my eyes.  We discussed possible next steps in coordinating PT with a high volume lumbar puncture.  I share the sentiment of his local team that I think this would be less likely to be helpful at this juncture.  Data suggests that  those with best response are those with primary gait disturbance.  If cognitive decline can be attributed to NPH it often has a poor response to shunting, which I discussed with them.  In obtaining theoretical CSF would also send Alzheimers biomarkers as I do not think this is completely excluded at this time either.    I also have hesitation that he will not be able to give much in the way of a PT assessment with limited gait he gave me today so large volume LP may not give us a good diagnostic response.  Even with such a response it is unclear to me if our neurosurgery team would offer a surgery given data that is less supportive of potential improvement when full triad of symptoms is present and this is a big surgery.  His family shares this skepticism that he could tolerate a large procedure.  I explained these possible next steps and family will discuss the options.    They will reach out with further questions, if they wish to pursue further testing with the above knowledge of NPH prognosis in general.             MANNY Thurman., ROBYN Camara. & BHASKAR Mendoza. Normal pressure hydrocephalus: Diagnosis and treatment. Curr Neurol Neurosci Rep 8, 371 (2008). [email or web address]/[email or web address]    Bhargav E, Ishchelsi M, Leon T, et al. Guidelines for management of idiopathic normal pressure hydrocephalus: second edition. Neurologia medico-chirurgica. 2012;52(11):775-809.       4/7/23 addendum: Spoke with patient's daughter, Radha, answered questions about prognosis, possible next steps.          Zach Keith MD   of Neurology   [facility]      History of Presenting Symptoms:   Keny Bernard is a 67 year old male who presents today for evaluation of concern for NPH    Symptoms--Year or so ago memory really started to go.   Gait symptoms --may have started after heart surgery November 2022.   Hard to describe changes may have been present in an occult fashion previously.    Has hearing loss  Previous 26/30 to testing they note  He continues to have accidents regarding urine, can't get there in time.      --Sleep: poor for some time  --Mood:  Fluctuates quite a bit  --ADLs: may need help with clothing, bathing self.   --Drinking/drug use: used to drink excessively,nothing recent  --Head trauma history: hit head on ice a few years prior  --Family history: parents had some memory loss but they were in 80s.   --Driving--not      Is working with PT at [facility]    Medications notable for diamox, effexor, gabapentin, trazodone, citalopram, depakote    They live in [place]  Here with wife Lisandra Sheth (sister of Meryl)  He is in a nearby nursing home been there for 3-4 weeks.  Needs help with PT/OT.    Retired .      [facility]  Update: Discussed about EMG/NCS of upper extremity which shows moderate  sensorimotor carpal tunnel syndrome plus moderate dense dependent sensory peripheral neuropathy in lower extremities. Recently had an episode of weakness and lower extremity which improved after some hours. Did not lose his consciousness. Brain CT scan without contrast and CT angiography head and neck did not show acute lesion. I did not believe that the NPH surgery can help to the gait or how much it can help.  Previously, neurosurgery saw him and also had the same thought. They also did the physical therapy.  I started folic acid tablet and seems that it works with lower extremity gait.  Neurosurgery saw the patient he did not previously try to increase Diamox to half a tablet daily.  But he cannot tolerate it and bring it to the half a tablet, 125 mg every other night.  Also added Depakote 500 mg daily to 500 mg twice daily to decrease the Tylenol.  He cannot tolerate the Dilantin.  1 tablet at night again. Also increase the pramipexole to 1 mg at night.  However he did not use the pramipexole to cause drowsiness and he stopped it.  Not sure how much the Depakote helped but no side effect with hand.  Headache improved although still sometimes use Tylenol.  His gait is improved also and PT finish the course with them.  He feels more energy but is still sometimes feel tired although is working hard.  I reviewed the new MRI in April 22 I still find large ventricular disproportionate to the brain atrophy the same as before.  Previously he could not tolerate acetazolamide and I had to change it decreasing to 250 mg on Monday and Tuesday morning, 2 times a week.  Also started amitriptyline for headache but could not tolerate it.  Stopped that too.  I did EMG/NCS which shows length dependent primary sensory moderate peripheral neuropathy plus bilateral remote S1 radiculopathy mild to moderate.most of his gait problem is combination of peripheral neuropathy, knee problem including previous knee replacement which cause  angle between the knee's, something like genu valgum.   Patient in begining of 2022 had a fall 3 times in a day and then went to the emergency.  They did a brain CT and found the patient has hydrocephalus.  Considering his gait problem hydrocephalus and headache patient is referredfor evaluation of normal pressure hydrocephalus.  Patient says that he had fallen in the past 2.  Also has some memory problem sometimes forget the staff but does not know how long that this problem wife did not notice that.  Patient says that he is doing all the ADLs by himself he is paying the bills all the calculations never lost the way back to the home.  Memory problem she also sometimes remembering the names are not.  Also has urinary problem and should go to the bathroom multiple times, frequency but does not have urinary incontinence.  His headache is mostly bitemporal and pressure in the does not know that there is a ear ringing or not.  Denies swallowing problems speaking problem has a hard hearing denies weakness in the hands or legs.  May have some numbness in lower extremities.  Diagnosis and Plan     1. NPH (normal pressure hydrocephalus) (HCC) thiamine (VIT B1) 50 mg oral Tablet     2. RLS (restless legs syndrome) thiamine (VIT B1) 50 mg oral Tablet     3. Lumbosacral radiculopathy at S1 thiamine (VIT B1) 50 mg oral Tablet     4. Memory problem thiamine (VIT B1) 50 mg oral Tablet     5. Bilateral low back pain with sciatica, sciatica laterality unspecified, unspecified chronicity thiamine (VIT B1) 50 mg oral Tablet     6. Falls frequently thiamine (VIT B1) 50 mg oral Tablet     Discussed about the result of EMG/NCS noted problem hands.  Suggest continued physical therapy exercises more symptomatic at this point also gave 1 medication to help with his CTS and probably for neuropathy. Patient is agreeable with the plan. Offered him that I can send him to the [facility] for second opinion.    Orders Placed This  "Encounter   Medications     thiamine (VIT B1) 50 mg oral Tablet   Sig: Take 1 Tablet (50 mg) by mouth in the morning.   Dispense: 90 Tablet   Refill: 3     No follow-ups on file.     Comment     Time spent: 60 minutes of which more than 50% were spent in counseling or coordination of care which included review of records, request of records if indicated, explaining the diagnosis and prognosis, discussing the pathophysiology of the diagnosis if appropriate and the treatment plan including adverse effects of medication (if prescribed). Excluding time for any procedure done on this day.The time consist of pre-visit and post visit chart review,labs, images ,MRI, CT, EMG/NCS, EEG or other para clinic tests, time for preparing deferential diagnosis, assessment ,formulate plan of care on that day. Excluding time for any procedure done on this day.    Nito Trimble MD\"  [place] [address]          Past Medical History:   There is no problem list on file for this patient.    Past Medical History:   Diagnosis Date     ADHD (attention deficit hyperactivity disorder)      Chronic kidney disease (CKD), stage II (mild)      Diabetes mellitus type 2 in nonobese (H)      Diabetes mellitus with chronic kidney disease (H)      Esophageal reflux      Foot injury      Hand pain      High risk medication use      History of acute renal failure      History of morbid obesity      Hyperlipidemia      Hypertension      Hypokalemia      Iron deficiency      Joint pain      Myalgia and myositis      Obesity      Other and unspecified postsurgical nonabsorption      Pre-operative exam      Prepatellar bursitis      Prepatellar bursitis      Ptosis of eyelid      Sensorineural hearing loss of both ears      Sleep apnea      Vitamin B 12 deficiency      Vitamin B12 deficiency      Vitamin D deficiency         Past Surgical History:     Past Surgical History:   Procedure " Laterality Date     arthroscopy Right 2006    Knee     BYPASS GASTRIC ARIANE-EN-Y, LIVER BIOPSY, COMBINED       COLONOSCOPY       LAPAROSCOPIC CHOLECYSTECTOMY       REPAIR PTOSIS BILATERAL Bilateral 4/3/2015    Procedure: REPAIR PTOSIS BILATERAL;  Surgeon: Anthony Hanson MD;  Location: [facility]        Social History:     Social History     Tobacco Use     Smoking status: Former     Types: Cigarettes     Quit date: 4/3/1995     Years since quittin.0     Smokeless tobacco: Never   Substance Use Topics     Alcohol use: No     Drug use: No          Medications:     Current Outpatient Medications   Medication Sig     acetaminophen (TYLENOL) 325 MG tablet Take 650 mg by mouth 2 times daily     amLODIPine-atorvastatin (CADUET) 10-10 MG per tablet Take 1 tablet by mouth daily     aspirin (ASA) 81 MG chewable tablet Take 81 mg by mouth daily     CITALOPRAM HYDROBROMIDE PO Take 20 mg by mouth daily     divalproex sodium delayed-release (DEPAKOTE) 500 MG DR tablet      fesoterodine fumarate (TOVIAZ) 4 MG TB24 24 hr tablet Take 4 mg by mouth daily     folic acid (FOLVITE) 1 MG tablet      gabapentin (NEURONTIN) 300 MG capsule Take 900 mg by mouth     LEVOTHYROXINE SODIUM PO Take 25 mcg by mouth daily     lisinopril (ZESTRIL) 20 MG tablet Take 20 mg by mouth daily     melatonin 5 MG tablet Take 3 mg by mouth     omeprazole (PRILOSEC) 40 MG DR capsule Take 40 mg by mouth     pramipexole (MIRAPEX) 0.5 MG tablet Take 1 mg by mouth daily     propranolol ER (INDERAL LA) 120 MG 24 hr capsule Take 120 mg by mouth daily     tamsulosin (FLOMAX) 0.4 MG capsule      thiamine 50 MG TABS Take 50 mg by mouth daily     TRAMADOL HCL PO Take 50 mg by mouth every 6 hours as needed for moderate to severe pain     venlafaxine (EFFEXOR XR) 75 MG 24 hr capsule      acetaZOLAMIDE (DIAMOX) 250 MG tablet Take 250 mg by mouth     amLODIPine (NORVASC) 5 MG tablet  (Patient not taking: Reported on 2023)     ATENOLOL PO Take 100 mg by mouth daily  "(Patient not taking: Reported on 8/30/2022)     ENALAPRIL MALEATE PO Take 20 mg by mouth (Patient not taking: Reported on 4/6/2023)     famotidine (PEPCID) 40 MG tablet  (Patient not taking: Reported on 4/6/2023)     famotidine (PEPCID) 40 MG tablet Take 1 tablet by mouth (Patient not taking: Reported on 8/30/2022)     HYDROCHLOROTHIAZIDE PO Take 25 mg by mouth daily (Patient not taking: Reported on 8/30/2022)     Isosorbide Mononitrate (IMDUR PO) Take 60 mg by mouth daily (Patient not taking: Reported on 4/6/2023)     METHYLPHENIDATE HCL PO Take 20 mg by mouth daily ER(extended release) (Patient not taking: Reported on 8/30/2022)     metoprolol tartrate (LOPRESSOR) 50 MG tablet Take 1 tablet by mouth 2 times daily (Patient not taking: Reported on 4/6/2023)     multivitamin w/minerals (THERA-VIT-M) tablet Take 1 tablet by mouth daily (Patient not taking: Reported on 8/30/2022)     OMEPRAZOLE PO Take 20 mg by mouth 2 times daily (before meals)     SIMVASTATIN PO Take 20 mg by mouth At Bedtime (Patient not taking: Reported on 8/30/2022)     spironolactone (ALDACTONE) 25 MG tablet  (Patient not taking: Reported on 4/6/2023)     VITAMIN D, CHOLECALCIFEROL, PO Take 1,000 Units by mouth daily (Patient not taking: Reported on 8/30/2022)     No current facility-administered medications for this visit.        Allergies:     Allergies   Allergen Reactions     Pollen Extract         Review of Systems:   As noted above     Physical Exam:   Vitals: BP (!) 144/93   Pulse 67   Ht 1.778 m (5' 10\")   Wt 113.9 kg (251 lb)   BMI 36.01 kg/m       Neuro:   Mental Status: 7/30 to testing.  Very easily distracted, struggles with basic commands    Cranial Nerves:   II: Visual fields: normal  III: Pupils: 3 mm, equal, round, reactive to light   III,IV,VI: Extraocular Movements: intact   VII: Facial strength: intact without clear asymmetry  VIII: Hearing: intact grossly  IX: Palate: intact   XII: Tongue movement: normal     Motor Exam: "   No clear focal weakness    Sensory: intact to light touch as can assess    Coordination: no dysmetria with finger-to-nose bilaterally    Reflexes: biceps, triceps, brachioradialis, patellar, and ankle jerks 1+ to trace  and symmetric.     Gait: Very unsteady on feet, takes a few magnetic steps then stops, states was tired from PT         Data: Pertinent prior to visit   Imaging:  CT HEAD WITHOUT CONTRAST  5/16/2018 12:08 PM     HISTORY: Headache and weakness.     TECHNIQUE: Scans were obtained through the head without IV contrast.  Radiation dose for this scan was reduced using automated exposure  control, adjustment of the mA and/or kV according to patient size, or  iterative reconstruction technique.     COMPARISON: None.     FINDINGS: Mild atrophy and chronic white matter disease.  No  hemorrhage, mass lesion, or focal area of acute infarction identified.  Paranasal sinuses are normal. No bony abnormality.                                                                      IMPRESSION:   1. Atrophy and chronic white matter disease.  2. Nothing acute.     2023 imaging  IMPRESSION:   HEAD CT:   1.  No CT evidence for acute intracranial process.   2.  Brain atrophy and presumed chronic microvascular ischemic changes   as above.   3.  The ventricles are disproportionate to the sulci, correlating   with the clinical concern for normal pressure hydrocephalus.   4.  Right maxillary air-fluid level. Correlate for acute sinusitis.     HEAD CTV:   1.  No intracranial venous thrombosis or stenosis      MR BRAIN wwo CONTRAST   EXAM: MR BRAIN wwo CONTRAST   LOCATION: [facility]   DATE/TIME: 3/7/2023 11:00 AM     INDICATION: Hydrocephalus. Routine. Had cardiac bypass and aortic   valve replacement November 2022. Obstructive hydrocephalus (HCC). NPH   (normal pressure hydrocephalus) (HCC).     COMPARISON: Head CT 12/27/2022, brain MRI 04/27/2022   CONTRAST: 10 mL of Gadavist   TECHNIQUE: Routine  multiplanar multisequence head MRI without and   with intravenous contrast.     FINDINGS:   INTRACRANIAL CONTENTS: Diffusion-weighted images demonstrate no areas   of restricted diffusion. No subacute or chronic intracranial   hemorrhage. Mild to moderate diffuse cerebral parenchymal volume   loss. No midline shift. The basilar cisterns are patent. No   cerebellar tonsillar ectopia. Moderately dilated lateral and third   ventricles although they are stable compared to the prior exam. There   is suggestion of crowding of the vertex. Moderate periventricular and   scattered foci of deep white matter T2 prolongation involving both   cerebral hemispheres and the central eileen.     SELLA: No abnormality accounting for technique.     OSSEOUS STRUCTURES/SOFT TISSUES: Stable small nonaggressive   fibro-osseous lesion involving the right parietal bone. The major   intracranial vascular flow voids are maintained.     ORBITS: No abnormality accounting for technique.     SINUSES/MASTOIDS: Moderate mucosal thickening of the right maxillary   Sinus breast cells. Mild mucosal thickening of the left maxillary   sinus and the left maxillary sinus.  No middle ear or mastoid   effusion.     IMPRESSION:   1.  Moderately dilated lateral and third ventricles although they are   stable compared to the prior brain MRI 04/27/2022. There is   suggestion of crowding of the vertex. Please correlate with clinical   findings of normal pressure hydrocephalus.   2.  No superimposed acute intracranial pathology.   3.  Mild to moderate diffuse cerebral parenchymal volume loss.   Presumed chronic hypertensive/microvascular ischemic white matter   Changes.         Miller ratio 0.37 to my review              Procedures:  2023 EMG      Laboratory:  Lab Results   Component Value Date/Time   Vitamin B12 781 08/15/2022 1045   Vitamin B12 520 03/16/2022 1436   Folate 4.7 (L) 08/15/2022 1045   Folate 6.8 03/16/2022 1436   TSH 2.86 12/13/2022 1214   TSH 1.40  03/21/2022 1530   HEMOGLOBIN A1C 6.4 (H) 11/09/2018 0853   HEMOGLOBIN A1C 6.3 (H) 04/24/2018 0236   HEMOGLOBIN A1C 6.4 (H) 12/08/2017 0924   Hemoglobin A1c 6.0 11/15/2022 1108   Hemoglobin A1c 6.5 (H) 11/02/2022 0547   Hemoglobin A1c 6.3 08/19/2021 0927   Hemoglobin A1c 6.7 (H) 05/08/2020 1513   Hemoglobin A1c 6.3 05/01/2019 1053   WBC Count, Corrected 4.0 (L) 12/27/2022 1830   Alanine Aminotransferase (ALT) 11 12/27/2022 1830   Creatinine 1.20 12/27/2022 1830   Erythrocyte Sedimentation Rate 3 08/15/2022 1045   Erythrocyte Sedimentation Rate 5 03/16/2022 1437   Sed Rate 5 11/10/2020 1544   Sed Rate 15 10/15/2020 0718   C-Reactive Protein <0.30 12/27/2022 1830   C-Reactive Protein <0.30 12/13/2022 1214   C-Reactive Protein <0.30 08/11/2022 1111   C-Reactive Protein 0.30 03/21/2022 1530   Kathe-1 Ab, IgG <0.2 08/15/2022 1045   Scl-70 Ab, IgG <0.2 08/15/2022 1045   SS-A/Ro Ab, IgG <0.2 08/15/2022 1045   SS-B/La Ab, IgG <0.2 08/15/2022 1045   Sm Abs, IgG <0.2 08/15/2022 1045   Hemoglobin 11.4 (L) 12/27/2022 1830   Ferritin 33.5 05/01/2019 1053   Ferritin 33.5 05/01/2019 1053   ACh Receptor (Muscle) Binding Ab 0.00 08/15/2022 1045     Recent Results (from the past 4032 hour(s))   NT PRO BRAIN NATRIURETIC PEPTIDE   Collection Time: 08/11/22 11:11 AM   Result Value Ref Range   NT-Pro  (H) 0 - 125 pg/mL   COMPREHENSIVE METABOLIC PANEL   Collection Time: 08/11/22 11:11 AM   Result Value Ref Range   Sodium 136 136 - 145 mmol/L   Potassium 3.8 3.5 - 5.1 mmol/L   Chloride 105 98 - 107 mmol/L   CO2 21 (L) 22 - 29 mmol/L   Anion Gap 13.8 10.0 - 20.0 mmol/L   Creatinine 1.10 0.70 - 1.20 mg/dL   Blood Urea Nitrogen 19.4 8.0 - 23.0 mg/dL   BUN/Creatinine Ratio 17.64 6.00 - 26.00 ratio   Calcium 9.0 8.4 - 10.2 mg/dL   Glucose 137 (H) 82 - 115 mg/dL   eGFR >60 >=60 mL/min/1.73m(2)   Total Protein 6.1 (L) 6.4 - 8.3 g/dL   Albumin 4.0 3.5 - 5.2 g/dL   Globulin 2.1 g/dL   Albumin/Globulin Ratio 1.9 1.1 - 2.2   Aspartate  Aminotransferase (AST) 14 6 - 40 U/L   Alanine Aminotransferase (ALT) 15 5 - 41 U/L   Alkaline Phosphatase 63 40 - 129 U/L   Bilirubin, Total 0.4 0.3 - 1.2 mg/dL   C-REACTIVE PROTEIN, NON-SENSITIVE   Collection Time: 08/11/22 11:11 AM   Result Value Ref Range   C-Reactive Protein <0.30 <=0.50 mg/dL   TROPONIN T   Collection Time: 08/11/22 11:13 AM   Result Value Ref Range   Troponin T <0.0100 0.0000 - 0.0100 ng/mL   COMPLETE BLOOD COUNT AND DIFFERENTIAL   Collection Time: 08/11/22 11:13 AM   Result Value Ref Range   WBC Count, Corrected 3.8 (L) 4.6 - 10.2 10(3)/uL   RBC Count 4.22 (L) 4.69 - 6.13 10(6)/uL   Hemoglobin 13.1 (L) 14.0 - 18.0 g/dL   Hematocrit 38.4 (L) 43.5 - 53.7 %   MCV 91.0 80.0 - 97.0 fL   MCH 31.0 27.0 - 31.2 pg   MCHC 34.1 31.8 - 35.4 g/dL   RDW 14.6 11.6 - 14.8 %   Platelets 159 142 - 424 10(3)/uL   MPV 8.0 fL   % Neutrophils 56.7 37.0 - 80.0 %   % Lymphocytes 32.4 10.0 - 50.0 %   % Monocytes 8.8 0.0 - 12.0 %   % Eosinophils 1.1 0.0 - 7.0 %   % Basophils 1.0 0.0 - 2.5 %   Abs Neutrophils 2.2 2.0 - 6.9 10(3)/uL   Abs Lymphocytes 1.2 0.6 - 3.4 10(3)/uL   Abs Monocytes 0.3 0.0 - 0.9 10(3)/uL   Abs Eosinophils 0.0 0.0 - 0.7 10(3)/uL   Abs Basophils 0.0 0.0 - 0.2 10(3)/uL   D-DIMER   Collection Time: 08/11/22 11:13 AM   Result Value Ref Range   D-Dimer <249.0 0.0 - 500.0 ng/mL FEU   COVID-19/SARS-COV2, NAAT (IN-HOUSE)   Collection Time: 08/11/22 11:21 AM   Specimen: Nares; Swab   Result Value Ref Range   COVID-19/SARS CoV2, NAAT Negative Negative   TROPONIN T   Collection Time: 08/11/22 12:32 PM   Result Value Ref Range   Troponin T <0.0100 0.0000 - 0.0100 ng/mL   ACETYLCHOLINE RECEPTOR BINDING AB   Collection Time: 08/15/22 10:45 AM   Result Value Ref Range   ACh Receptor (Muscle) Binding Ab 0.00 <=0.02 nmol/L   MUSCLE-SPECIFIC KINASE AUTOANTIBODY (MUSK)   Collection Time: 08/15/22 10:45 AM   Result Value Ref Range   MuSK Autoantibody 0.00 0.00 - 0.02 nmol/L   FOLATE   Collection Time: 08/15/22 10:45  AM   Result Value Ref Range   Folate 4.7 (L) 6.6 - 19.9 ng/mL   VITAMIN B12   Collection Time: 08/15/22 10:45 AM   Result Value Ref Range   Vitamin B12 781 200 - 1,000 pg/mL   VITAMIN B6   Collection Time: 08/15/22 10:45 AM   Result Value Ref Range   Pyridoxal 5-Phosphate (PLP) 89 (H) 5 - 50 mcg/L   EXTRACTABLE NUCLEAR AG EVALUATION   Collection Time: 08/15/22 10:45 AM   Result Value Ref Range   Kathe-1 Ab, IgG <0.2 <1.0 AI   Scl-70 Ab, IgG <0.2 <1.0 AI   Sm Abs, IgG <0.2 <1.0 AI   SS-A/Ro Ab, IgG <0.2 <1.0 AI   SS-B/La Ab, IgG <0.2 <1.0 AI   RNP Ab, IgG 0.5 <1.0 AI   ERYTHROCYTE SEDIMENTATION RATE   Collection Time: 08/15/22 10:45 AM   Result Value Ref Range   Erythrocyte Sedimentation Rate 3 0 - 15 mm/h   ANTINUCLEAR ANTIBODY SCREEN   Collection Time: 08/15/22 10:45 AM   Result Value Ref Range   Antinuclear Ab Screen Negative Negative   HEMOGLOBIN   Collection Time: 09/29/22 7:46 AM   Result Value Ref Range   Hemoglobin 12.7 (L) 13.1 - 17.1 g/dL   POTASSIUM   Collection Time: 09/29/22 7:46 AM   Result Value Ref Range   Potassium 4.1 3.5 - 5.1 mmol/L   CREATININE   Collection Time: 09/29/22 7:46 AM   Result Value Ref Range   Creatinine 1.08 0.72 - 1.25 mg/dL   eGFR >60 >=60 mL/min/1.73m(2)   eCrCl (Rx) - Adults 75.2 mL/min   LIPID PANEL   Collection Time: 09/29/22 7:46 AM   Result Value Ref Range   Cholesterol 145 <200 mg/dL   Triglycerides 116 30 - 150 mg/dL   Cholesterol, LDL (Calculated) 71 0 - 159 mg/dL   Cholesterol, HDL 51 >40 mg/dL   Cholesterol, vLDL 23 mg/dL   PLATELET COUNT   Collection Time: 09/29/22 7:46 AM   Result Value Ref Range   Platelets 152 (L) 179 - 450 10(3)/uL   PREPARE RED BLOOD CELLS   Collection Time: 11/02/22 5:36 AM   Result Value Ref Range   Unit Product Code R3173Y70   Unit Number [id number]   Unit ABO O   Unit RH POS   Unit Crossmatch Compatible   Unit Status RE   Unit Blood Type OPOS   Unit Expiration [id number]   Unit Product Barcode 5100   Unit Product Code K6964D68   Unit  Number [id number]   Unit ABO O   Unit RH POS   Unit Crossmatch Compatible   Unit Status RE   Unit Blood Type OPOS   Unit Expiration [id number]   Unit Product Barcode 5100   GLUCOSE, POC   Collection Time: 11/02/22 5:46 AM   Result Value Ref Range   Glucose by Meter 86 70 - 100 mg/dL   TYPE AND SCREEN   Collection Time: 11/02/22 5:47 AM   Result Value Ref Range   ABO Group O   Rh Type Positive   Antibody Screen Negative   BASIC METABOLIC PANEL   Collection Time: 11/02/22 5:47 AM   Result Value Ref Range   Sodium 141 136 - 146 mmol/L   Potassium 3.9 3.5 - 5.1 mmol/L   Chloride 111 (H) 98 - 107 mmol/L   CO2 21 (L) 22 - 29 mmol/L   Anion Gap 12.9 10.0 - 20.0 mmol/L   Creatinine 1.11 0.72 - 1.25 mg/dL   Blood Urea Nitrogen 20.8 (H) 10.0 - 20.0 mg/dL   BUN/Creatinine Ratio 18.74 11.70 - 22.90 ratio   Calcium 9.2 8.6 - 10.5 mg/dL   Glucose 86 70 - 100 mg/dL   eGFR >60 >=60 mL/min/1.73m(2)   eCrCl (Rx) - Adults 73.1 mL/min   GLYCOSYLATED HEMOGLOBIN, A1C   Collection Time: 11/02/22 5:47 AM   Result Value Ref Range   Hemoglobin A1c 6.5 (H) <5.7 %              The total time of this encounter today amounted to 90 minutes. This time included time spent with the patient, prep work, ordering tests, and performing post visit documentation.

## 2023-04-06 NOTE — PATIENT INSTRUCTIONS
Let me know if you want to proceed with lumbar puncture, PT lambert  Think it has a low chance of being helpful but these are possible next steps  Continue to follow with your local team  If you choose to pursue this downtown we can help facilitate this.

## 2023-04-06 NOTE — LETTER
4/6/2023         RE: Keny Bernard  [id number] [address] [place] [id number]        Dear Colleague,    Thank you for referring your patient, Keny Bernard, to the [facility]UR[facility] [place]. Please see a copy of my visit note below.    [facility]  Section of General Neurology  New Patient Visit      Keny Bernard MRN# [id number]   Age: 67 year old YOB: 1955              Assessment and Plan:     Keny Bernard is a 67 year old man who presents in consultation today.  He has had a decline over roughly 1 year's time and work up to date is suggestive of normal pressure hydrocephalus.  I do think he has more than age appropriate atrophy of his brain and that some dilation could be ex vacuo to an extent but to my review I do think this is more than simply ex vacuo changes and would meet radiological criteria for NPH.  He has profound issues with walking, gait does appear magnetic in the few steps he took.  MOCA was 7/30 today and he has had issues with incontinence.  This triad of symptoms is further supportive of NPH in my eyes.  We discussed possible next steps in coordinating PT with a high volume lumbar puncture.  I share the sentiment of his local team that I think this would be less likely to be helpful at this juncture.  Data suggests that  those with best response are those with primary gait disturbance.  If cognitive decline can be attributed to NPH it often has a poor response to shunting, which I discussed with them.  In obtaining theoretical CSF would also send Alzheimers biomarkers as I do not think this is completely excluded at this time either.    I also have hesitation that he will not be able to give much in the way of a PT assessment with limited gait he gave me today so large volume LP may not give us a good diagnostic response.  Even with such a response it is unclear to me if our neurosurgery team would offer a surgery given data that is  less supportive of potential improvement when full triad of symptoms is present and this is a big surgery.  His family shares this skepticism that he could tolerate a large procedure.  I explained these possible next steps and family will discuss the options.   They will reach out with further questions, if they wish to pursue further testing with the above knowledge of NPH prognosis in general.             MANNY Thurman., ROBYN Camara. & BHASKAR Mednoza. Normal pressure hydrocephalus: Diagnosis and treatment. Curr Neurol Neurosci Rep 8, 371 (2008). https://doi.org/10.1007/[email or web address]    Bhargav E, Cassius M, Leon T, et al. Guidelines for management of idiopathic normal pressure hydrocephalus: second edition. Neurologia medico-chirurgica. 2012;52(11):775-809.       4/7/23 addendum: Spoke with patient's daughter, Radha, answered questions about prognosis, possible next steps.          Zach Keith MD   of Neurology   [facility]      History of Presenting Symptoms:   Keny Bernard is a 67 year old male who presents today for evaluation of concern for NPH    Symptoms--Year or so ago memory really started to go.   Gait symptoms --may have started after heart surgery November 2022.   Hard to describe changes may have been present in an occult fashion previously.    Has hearing loss  Previous 26/30 to testing they note  He continues to have accidents regarding urine, can't get there in time.      --Sleep: poor for some time  --Mood:  Fluctuates quite a bit  --ADLs: may need help with clothing, bathing self.   --Drinking/drug use: used to drink excessively,nothing recent  --Head trauma history: hit head on ice a few years prior  --Family history: parents had some memory loss but they were in 80s.   --Driving--not      Is working with PT at [email or web address]    Medications notable for diamox, effexor, gabapentin, trazodone, citalopram, depakote    They live in [place]  Here with  wife Lisandra Sheth (sister of Meryl)  He is in a nearby nursing home been there for 3-4 weeks.  Needs help with PT/OT.    Retired .      1/2023 neurology note reviewed ([facility])  Update: Discussed about EMG/NCS of upper extremity which shows moderate sensorimotor carpal tunnel syndrome plus moderate dense dependent sensory peripheral neuropathy in lower extremities. Recently had an episode of weakness and lower extremity which improved after some hours. Did not lose his consciousness. Brain CT scan without contrast and CT angiography head and neck did not show acute lesion. I did not believe that the NPH surgery can help to the gait or how much it can help.  Previously, neurosurgery saw him and also had the same thought. They also did the physical therapy.  I started folic acid tablet and seems that it works with lower extremity gait.  Neurosurgery saw the patient he did not previously try to increase Diamox to half a tablet daily.  But he cannot tolerate it and bring it to the half a tablet, 125 mg every other night.  Also added Depakote 500 mg daily to 500 mg twice daily to decrease the Tylenol.  He cannot tolerate the Dilantin.  1 tablet at night again. Also increase the pramipexole to 1 mg at night.  However he did not use the pramipexole to cause drowsiness and he stopped it.  Not sure how much the Depakote helped but no side effect with hand.  Headache improved although still sometimes use Tylenol.  His gait is improved also and PT finish the course with them.  He feels more energy but is still sometimes feel tired although is working hard.  I reviewed the new MRI in April 22 I still find large ventricular disproportionate to the brain atrophy the same as before.  Previously he could not tolerate acetazolamide and I had to change it decreasing to 250 mg on Monday and Tuesday morning, 2 times a week.  Also started amitriptyline for headache but could not tolerate it.  Stopped that too.  I did EMG/NCS which  shows length dependent primary sensory moderate peripheral neuropathy plus bilateral remote S1 radiculopathy mild to moderate.most of his gait problem is combination of peripheral neuropathy, knee problem including previous knee replacement which cause angle between the knee's, something like genu valgum.   Patient in begining of 2022 had a fall 3 times in a day and then went to the emergency.  They did a brain CT and found the patient has hydrocephalus.  Considering his gait problem hydrocephalus and headache patient is referredfor evaluation of normal pressure hydrocephalus.  Patient says that he had fallen in the past 2.  Also has some memory problem sometimes forget the staff but does not know how long that this problem wife did not notice that.  Patient says that he is doing all the ADLs by himself he is paying the bills all the calculations never lost the way back to the home.  Memory problem she also sometimes remembering the names are not.  Also has urinary problem and should go to the bathroom multiple times, frequency but does not have urinary incontinence.  His headache is mostly bitemporal and pressure in the does not know that there is a ear ringing or not.  Denies swallowing problems speaking problem has a hard hearing denies weakness in the hands or legs.  May have some numbness in lower extremities.  Diagnosis and Plan     1. NPH (normal pressure hydrocephalus) (HCC) thiamine (VIT B1) 50 mg oral Tablet     2. RLS (restless legs syndrome) thiamine (VIT B1) 50 mg oral Tablet     3. Lumbosacral radiculopathy at S1 thiamine (VIT B1) 50 mg oral Tablet     4. Memory problem thiamine (VIT B1) 50 mg oral Tablet     5. Bilateral low back pain with sciatica, sciatica laterality unspecified, unspecified chronicity thiamine (VIT B1) 50 mg oral Tablet     6. Falls frequently thiamine (VIT B1) 50 mg oral Tablet     Discussed about the result of EMG/NCS noted problem hands.  Suggest continued physical therapy  "exercises more symptomatic at this point also gave 1 medication to help with his CTS and probably for neuropathy. Patient is agreeable with the plan. Offered him that I can send him to the [facility] for second opinion.    Orders Placed This Encounter   Medications     thiamine (VIT B1) 50 mg oral Tablet   Sig: Take 1 Tablet (50 mg) by mouth in the morning.   Dispense: 90 Tablet   Refill: 3     No follow-ups on file.     Comment     Time spent: 60 minutes of which more than 50% were spent in counseling or coordination of care which included review of records, request of records if indicated, explaining the diagnosis and prognosis, discussing the pathophysiology of the diagnosis if appropriate and the treatment plan including adverse effects of medication (if prescribed). Excluding time for any procedure done on this day.The time consist of pre-visit and post visit chart review,labs, images ,MRI, CT, EMG/NCS, EEG or other para clinic tests, time for preparing deferential diagnosis, assessment ,formulate plan of care on that day. Excluding time for any procedure done on this day.    Nito Trimble MD\"  Board Certified Neurology  [address] [address]          Past Medical History:   There is no problem list on file for this patient.    Past Medical History:   Diagnosis Date     ADHD (attention deficit hyperactivity disorder)      Chronic kidney disease (CKD), stage II (mild)      Diabetes mellitus type 2 in nonobese (H)      Diabetes mellitus with chronic kidney disease (H)      Esophageal reflux      Foot injury      Hand pain      High risk medication use      History of acute renal failure      History of morbid obesity      Hyperlipidemia      Hypertension      Hypokalemia      Iron deficiency      Joint pain      Myalgia and myositis      Obesity      Other and unspecified postsurgical nonabsorption      Pre-operative exam      Prepatellar bursitis      " Prepatellar bursitis      Ptosis of eyelid      Sensorineural hearing loss of both ears      Sleep apnea      Vitamin B 12 deficiency      Vitamin B12 deficiency      Vitamin D deficiency         Past Surgical History:     Past Surgical History:   Procedure Laterality Date     arthroscopy Right 2006    Knee     BYPASS GASTRIC ARIANE-EN-Y, LIVER BIOPSY, COMBINED       COLONOSCOPY       LAPAROSCOPIC CHOLECYSTECTOMY       REPAIR PTOSIS BILATERAL Bilateral 4/3/2015    Procedure: REPAIR PTOSIS BILATERAL;  Surgeon: Anthony Hanson MD;  Location: [facility]        Social History:     Social History     Tobacco Use     Smoking status: Former     Types: Cigarettes     Quit date: 4/3/1995     Years since quittin.0     Smokeless tobacco: Never   Substance Use Topics     Alcohol use: No     Drug use: No          Medications:     Current Outpatient Medications   Medication Sig     acetaminophen (TYLENOL) 325 MG tablet Take 650 mg by mouth 2 times daily     amLODIPine-atorvastatin (CADUET) 10-10 MG per tablet Take 1 tablet by mouth daily     aspirin (ASA) 81 MG chewable tablet Take 81 mg by mouth daily     CITALOPRAM HYDROBROMIDE PO Take 20 mg by mouth daily     divalproex sodium delayed-release (DEPAKOTE) 500 MG DR tablet      fesoterodine fumarate (TOVIAZ) 4 MG TB24 24 hr tablet Take 4 mg by mouth daily     folic acid (FOLVITE) 1 MG tablet      gabapentin (NEURONTIN) 300 MG capsule Take 900 mg by mouth     LEVOTHYROXINE SODIUM PO Take 25 mcg by mouth daily     lisinopril (ZESTRIL) 20 MG tablet Take 20 mg by mouth daily     melatonin 5 MG tablet Take 3 mg by mouth     omeprazole (PRILOSEC) 40 MG DR capsule Take 40 mg by mouth     pramipexole (MIRAPEX) 0.5 MG tablet Take 1 mg by mouth daily     propranolol ER (INDERAL LA) 120 MG 24 hr capsule Take 120 mg by mouth daily     tamsulosin (FLOMAX) 0.4 MG capsule      thiamine 50 MG TABS Take 50 mg by mouth daily     TRAMADOL HCL PO Take 50 mg by mouth every 6 hours as needed for  "moderate to severe pain     venlafaxine (EFFEXOR XR) 75 MG 24 hr capsule      acetaZOLAMIDE (DIAMOX) 250 MG tablet Take 250 mg by mouth     amLODIPine (NORVASC) 5 MG tablet  (Patient not taking: Reported on 4/6/2023)     ATENOLOL PO Take 100 mg by mouth daily (Patient not taking: Reported on 8/30/2022)     ENALAPRIL MALEATE PO Take 20 mg by mouth (Patient not taking: Reported on 4/6/2023)     famotidine (PEPCID) 40 MG tablet  (Patient not taking: Reported on 4/6/2023)     famotidine (PEPCID) 40 MG tablet Take 1 tablet by mouth (Patient not taking: Reported on 8/30/2022)     HYDROCHLOROTHIAZIDE PO Take 25 mg by mouth daily (Patient not taking: Reported on 8/30/2022)     Isosorbide Mononitrate (IMDUR PO) Take 60 mg by mouth daily (Patient not taking: Reported on 4/6/2023)     METHYLPHENIDATE HCL PO Take 20 mg by mouth daily ER(extended release) (Patient not taking: Reported on 8/30/2022)     metoprolol tartrate (LOPRESSOR) 50 MG tablet Take 1 tablet by mouth 2 times daily (Patient not taking: Reported on 4/6/2023)     multivitamin w/minerals (THERA-VIT-M) tablet Take 1 tablet by mouth daily (Patient not taking: Reported on 8/30/2022)     OMEPRAZOLE PO Take 20 mg by mouth 2 times daily (before meals)     SIMVASTATIN PO Take 20 mg by mouth At Bedtime (Patient not taking: Reported on 8/30/2022)     spironolactone (ALDACTONE) 25 MG tablet  (Patient not taking: Reported on 4/6/2023)     VITAMIN D, CHOLECALCIFEROL, PO Take 1,000 Units by mouth daily (Patient not taking: Reported on 8/30/2022)     No current facility-administered medications for this visit.        Allergies:     Allergies   Allergen Reactions     Pollen Extract         Review of Systems:   As noted above     Physical Exam:   Vitals: BP (!) 144/93   Pulse 67   Ht 1.778 m (5' 10\")   Wt 113.9 kg (251 lb)   BMI 36.01 kg/m       Neuro:   Mental Status: 7/30 to testing.  Very easily distracted, struggles with basic commands    Cranial Nerves:   II: Visual " fields: normal  III: Pupils: 3 mm, equal, round, reactive to light   III,IV,VI: Extraocular Movements: intact   VII: Facial strength: intact without clear asymmetry  VIII: Hearing: intact grossly  IX: Palate: intact   XII: Tongue movement: normal     Motor Exam:   No clear focal weakness    Sensory: intact to light touch as can assess    Coordination: no dysmetria with finger-to-nose bilaterally    Reflexes: biceps, triceps, brachioradialis, patellar, and ankle jerks 1+ to trace  and symmetric.     Gait: Very unsteady on feet, takes a few magnetic steps then stops, states was tired from PT         Data: Pertinent prior to visit   Imaging:  CT HEAD WITHOUT CONTRAST  5/16/2018 12:08 PM     HISTORY: Headache and weakness.     TECHNIQUE: Scans were obtained through the head without IV contrast.  Radiation dose for this scan was reduced using automated exposure  control, adjustment of the mA and/or kV according to patient size, or  iterative reconstruction technique.     COMPARISON: None.     FINDINGS: Mild atrophy and chronic white matter disease.  No  hemorrhage, mass lesion, or focal area of acute infarction identified.  Paranasal sinuses are normal. No bony abnormality.                                                                      IMPRESSION:   1. Atrophy and chronic white matter disease.  2. Nothing acute.     2023 imaging  IMPRESSION:   HEAD CT:   1.  No CT evidence for acute intracranial process.   2.  Brain atrophy and presumed chronic microvascular ischemic changes   as above.   3.  The ventricles are disproportionate to the sulci, correlating   with the clinical concern for normal pressure hydrocephalus.   4.  Right maxillary air-fluid level. Correlate for acute sinusitis.     HEAD CTV:   1.  No intracranial venous thrombosis or stenosis      MR BRAIN wwo CONTRAST   EXAM: MR BRAIN wwo CONTRAST   LOCATION: [facility]   DATE/TIME: 3/7/2023 11:00 AM     INDICATION: Hydrocephalus.  Routine. Had cardiac bypass and aortic   valve replacement November 2022. Obstructive hydrocephalus (HCC). NPH   (normal pressure hydrocephalus) (HCC).     COMPARISON: Head CT 12/27/2022, brain MRI 04/27/2022   CONTRAST: 10 mL of Gadavist   TECHNIQUE: Routine multiplanar multisequence head MRI without and   with intravenous contrast.     FINDINGS:   INTRACRANIAL CONTENTS: Diffusion-weighted images demonstrate no areas   of restricted diffusion. No subacute or chronic intracranial   hemorrhage. Mild to moderate diffuse cerebral parenchymal volume   loss. No midline shift. The basilar cisterns are patent. No   cerebellar tonsillar ectopia. Moderately dilated lateral and third   ventricles although they are stable compared to the prior exam. There   is suggestion of crowding of the vertex. Moderate periventricular and   scattered foci of deep white matter T2 prolongation involving both   cerebral hemispheres and the central eileen.     SELLA: No abnormality accounting for technique.     OSSEOUS STRUCTURES/SOFT TISSUES: Stable small nonaggressive   fibro-osseous lesion involving the right parietal bone. The major   intracranial vascular flow voids are maintained.     ORBITS: No abnormality accounting for technique.     SINUSES/MASTOIDS: Moderate mucosal thickening of the right maxillary   Sinus breast cells. Mild mucosal thickening of the left maxillary   sinus and the left maxillary sinus.  No middle ear or mastoid   effusion.     IMPRESSION:   1.  Moderately dilated lateral and third ventricles although they are   stable compared to the prior brain MRI 04/27/2022. There is   suggestion of crowding of the vertex. Please correlate with clinical   findings of normal pressure hydrocephalus.   2.  No superimposed acute intracranial pathology.   3.  Mild to moderate diffuse cerebral parenchymal volume loss.   Presumed chronic hypertensive/microvascular ischemic white matter   Changes.         Miller ratio 0.37 to my  review              Procedures:  2023 EMG      Laboratory:  Lab Results   Component Value Date/Time   Vitamin B12 781 08/15/2022 1045   Vitamin B12 520 03/16/2022 1436   Folate 4.7 (L) 08/15/2022 1045   Folate 6.8 03/16/2022 1436   TSH 2.86 12/13/2022 1214   TSH 1.40 03/21/2022 1530   HEMOGLOBIN A1C 6.4 (H) 11/09/2018 0853   HEMOGLOBIN A1C 6.3 (H) 04/24/2018 0236   HEMOGLOBIN A1C 6.4 (H) 12/08/2017 0924   Hemoglobin A1c 6.0 11/15/2022 1108   Hemoglobin A1c 6.5 (H) 11/02/2022 0547   Hemoglobin A1c 6.3 08/19/2021 0927   Hemoglobin A1c 6.7 (H) 05/08/2020 1513   Hemoglobin A1c 6.3 05/01/2019 1053   WBC Count, Corrected 4.0 (L) 12/27/2022 1830   Alanine Aminotransferase (ALT) 11 12/27/2022 1830   Creatinine 1.20 12/27/2022 1830   Erythrocyte Sedimentation Rate 3 08/15/2022 1045   Erythrocyte Sedimentation Rate 5 03/16/2022 1437   Sed Rate 5 11/10/2020 1544   Sed Rate 15 10/15/2020 0718   C-Reactive Protein <0.30 12/27/2022 1830   C-Reactive Protein <0.30 12/13/2022 1214   C-Reactive Protein <0.30 08/11/2022 1111   C-Reactive Protein 0.30 03/21/2022 1530   Kathe-1 Ab, IgG <0.2 08/15/2022 1045   Scl-70 Ab, IgG <0.2 08/15/2022 1045   SS-A/Ro Ab, IgG <0.2 08/15/2022 1045   SS-B/La Ab, IgG <0.2 08/15/2022 1045   Sm Abs, IgG <0.2 08/15/2022 1045   Hemoglobin 11.4 (L) 12/27/2022 1830   Ferritin 33.5 05/01/2019 1053   Ferritin 33.5 05/01/2019 1053   ACh Receptor (Muscle) Binding Ab 0.00 08/15/2022 1045     Recent Results (from the past 4032 hour(s))   NT PRO BRAIN NATRIURETIC PEPTIDE   Collection Time: 08/11/22 11:11 AM   Result Value Ref Range   NT-Pro  (H) 0 - 125 pg/mL   COMPREHENSIVE METABOLIC PANEL   Collection Time: 08/11/22 11:11 AM   Result Value Ref Range   Sodium 136 136 - 145 mmol/L   Potassium 3.8 3.5 - 5.1 mmol/L   Chloride 105 98 - 107 mmol/L   CO2 21 (L) 22 - 29 mmol/L   Anion Gap 13.8 10.0 - 20.0 mmol/L   Creatinine 1.10 0.70 - 1.20 mg/dL   Blood Urea Nitrogen 19.4 8.0 - 23.0 mg/dL   BUN/Creatinine Ratio  17.64 6.00 - 26.00 ratio   Calcium 9.0 8.4 - 10.2 mg/dL   Glucose 137 (H) 82 - 115 mg/dL   eGFR >60 >=60 mL/min/1.73m(2)   Total Protein 6.1 (L) 6.4 - 8.3 g/dL   Albumin 4.0 3.5 - 5.2 g/dL   Globulin 2.1 g/dL   Albumin/Globulin Ratio 1.9 1.1 - 2.2   Aspartate Aminotransferase (AST) 14 6 - 40 U/L   Alanine Aminotransferase (ALT) 15 5 - 41 U/L   Alkaline Phosphatase 63 40 - 129 U/L   Bilirubin, Total 0.4 0.3 - 1.2 mg/dL   C-REACTIVE PROTEIN, NON-SENSITIVE   Collection Time: 08/11/22 11:11 AM   Result Value Ref Range   C-Reactive Protein <0.30 <=0.50 mg/dL   TROPONIN T   Collection Time: 08/11/22 11:13 AM   Result Value Ref Range   Troponin T <0.0100 0.0000 - 0.0100 ng/mL   COMPLETE BLOOD COUNT AND DIFFERENTIAL   Collection Time: 08/11/22 11:13 AM   Result Value Ref Range   WBC Count, Corrected 3.8 (L) 4.6 - 10.2 10(3)/uL   RBC Count 4.22 (L) 4.69 - 6.13 10(6)/uL   Hemoglobin 13.1 (L) 14.0 - 18.0 g/dL   Hematocrit 38.4 (L) 43.5 - 53.7 %   MCV 91.0 80.0 - 97.0 fL   MCH 31.0 27.0 - 31.2 pg   MCHC 34.1 31.8 - 35.4 g/dL   RDW 14.6 11.6 - 14.8 %   Platelets 159 142 - 424 10(3)/uL   MPV 8.0 fL   % Neutrophils 56.7 37.0 - 80.0 %   % Lymphocytes 32.4 10.0 - 50.0 %   % Monocytes 8.8 0.0 - 12.0 %   % Eosinophils 1.1 0.0 - 7.0 %   % Basophils 1.0 0.0 - 2.5 %   Abs Neutrophils 2.2 2.0 - 6.9 10(3)/uL   Abs Lymphocytes 1.2 0.6 - 3.4 10(3)/uL   Abs Monocytes 0.3 0.0 - 0.9 10(3)/uL   Abs Eosinophils 0.0 0.0 - 0.7 10(3)/uL   Abs Basophils 0.0 0.0 - 0.2 10(3)/uL   D-DIMER   Collection Time: 08/11/22 11:13 AM   Result Value Ref Range   D-Dimer <249.0 0.0 - 500.0 ng/mL FEU   COVID-19/SARS-COV2, NAAT (IN-HOUSE)   Collection Time: 08/11/22 11:21 AM   Specimen: Nares; Swab   Result Value Ref Range   COVID-19/SARS CoV2, NAAT Negative Negative   TROPONIN T   Collection Time: 08/11/22 12:32 PM   Result Value Ref Range   Troponin T <0.0100 0.0000 - 0.0100 ng/mL   ACETYLCHOLINE RECEPTOR BINDING AB   Collection Time: 08/15/22 10:45 AM   Result  Value Ref Range   ACh Receptor (Muscle) Binding Ab 0.00 <=0.02 nmol/L   MUSCLE-SPECIFIC KINASE AUTOANTIBODY (MUSK)   Collection Time: 08/15/22 10:45 AM   Result Value Ref Range   MuSK Autoantibody 0.00 0.00 - 0.02 nmol/L   FOLATE   Collection Time: 08/15/22 10:45 AM   Result Value Ref Range   Folate 4.7 (L) 6.6 - 19.9 ng/mL   VITAMIN B12   Collection Time: 08/15/22 10:45 AM   Result Value Ref Range   Vitamin B12 781 200 - 1,000 pg/mL   VITAMIN B6   Collection Time: 08/15/22 10:45 AM   Result Value Ref Range   Pyridoxal 5-Phosphate (PLP) 89 (H) 5 - 50 mcg/L   EXTRACTABLE NUCLEAR AG EVALUATION   Collection Time: 08/15/22 10:45 AM   Result Value Ref Range   Kathe-1 Ab, IgG <0.2 <1.0 AI   Scl-70 Ab, IgG <0.2 <1.0 AI   Sm Abs, IgG <0.2 <1.0 AI   SS-A/Ro Ab, IgG <0.2 <1.0 AI   SS-B/La Ab, IgG <0.2 <1.0 AI   RNP Ab, IgG 0.5 <1.0 AI   ERYTHROCYTE SEDIMENTATION RATE   Collection Time: 08/15/22 10:45 AM   Result Value Ref Range   Erythrocyte Sedimentation Rate 3 0 - 15 mm/h   ANTINUCLEAR ANTIBODY SCREEN   Collection Time: 08/15/22 10:45 AM   Result Value Ref Range   Antinuclear Ab Screen Negative Negative   HEMOGLOBIN   Collection Time: 09/29/22 7:46 AM   Result Value Ref Range   Hemoglobin 12.7 (L) 13.1 - 17.1 g/dL   POTASSIUM   Collection Time: 09/29/22 7:46 AM   Result Value Ref Range   Potassium 4.1 3.5 - 5.1 mmol/L   CREATININE   Collection Time: 09/29/22 7:46 AM   Result Value Ref Range   Creatinine 1.08 0.72 - 1.25 mg/dL   eGFR >60 >=60 mL/min/1.73m(2)   eCrCl (Rx) - Adults 75.2 mL/min   LIPID PANEL   Collection Time: 09/29/22 7:46 AM   Result Value Ref Range   Cholesterol 145 <200 mg/dL   Triglycerides 116 30 - 150 mg/dL   Cholesterol, LDL (Calculated) 71 0 - 159 mg/dL   Cholesterol, HDL 51 >40 mg/dL   Cholesterol, vLDL 23 mg/dL   PLATELET COUNT   Collection Time: 09/29/22 7:46 AM   Result Value Ref Range   Platelets 152 (L) 179 - 450 10(3)/uL   PREPARE RED BLOOD CELLS   Collection Time: 11/02/22 5:36 AM   Result Value  Ref Range   Unit Product Code E6256B96   Unit Number [id number]   Unit ABO O   Unit RH POS   Unit Crossmatch Compatible   Unit Status RE   Unit Blood Type OPOS   Unit Expiration [id number]   Unit Product Barcode 5100   Unit Product Code P5816R96   Unit Number [id number]   Unit ABO O   Unit RH POS   Unit Crossmatch Compatible   Unit Status RE   Unit Blood Type OPOS   Unit Expiration [id number]   Unit Product Barcode 5100   GLUCOSE, POC   Collection Time: 11/02/22 5:46 AM   Result Value Ref Range   Glucose by Meter 86 70 - 100 mg/dL   TYPE AND SCREEN   Collection Time: 11/02/22 5:47 AM   Result Value Ref Range   ABO Group O   Rh Type Positive   Antibody Screen Negative   BASIC METABOLIC PANEL   Collection Time: 11/02/22 5:47 AM   Result Value Ref Range   Sodium 141 136 - 146 mmol/L   Potassium 3.9 3.5 - 5.1 mmol/L   Chloride 111 (H) 98 - 107 mmol/L   CO2 21 (L) 22 - 29 mmol/L   Anion Gap 12.9 10.0 - 20.0 mmol/L   Creatinine 1.11 0.72 - 1.25 mg/dL   Blood Urea Nitrogen 20.8 (H) 10.0 - 20.0 mg/dL   BUN/Creatinine Ratio 18.74 11.70 - 22.90 ratio   Calcium 9.2 8.6 - 10.5 mg/dL   Glucose 86 70 - 100 mg/dL   eGFR >60 >=60 mL/min/1.73m(2)   eCrCl (Rx) - Adults 73.1 mL/min   GLYCOSYLATED HEMOGLOBIN, A1C   Collection Time: 11/02/22 5:47 AM   Result Value Ref Range   Hemoglobin A1c 6.5 (H) <5.7 %              The total time of this encounter today amounted to 90 minutes. This time included time spent with the patient, prep work, ordering tests, and performing post visit documentation.      Again, thank you for allowing me to participate in the care of your patient.        Sincerely,        Jairo Keith MD

## 2023-04-06 NOTE — NURSING NOTE
"Keny Bernard's goals for this visit include: ,rfv    He requests these members of his care team be copied on today's visit information: yes    PCP: Lola Quezada    Referring Provider:  No referring provider defined for this encounter.    BP (!) 144/93   Pulse 67   Ht 1.778 m (5' 10\")   Wt 113.9 kg (251 lb)   BMI 36.01 kg/m      Do you need any medication refills at today's visit? No  KAITLIN Borden, CMA ([facility])      "